# Patient Record
Sex: MALE | Race: WHITE | Employment: UNEMPLOYED | URBAN - METROPOLITAN AREA
[De-identification: names, ages, dates, MRNs, and addresses within clinical notes are randomized per-mention and may not be internally consistent; named-entity substitution may affect disease eponyms.]

---

## 2017-02-01 ENCOUNTER — HOSPITAL ENCOUNTER (OUTPATIENT)
Dept: GENERAL RADIOLOGY | Age: 62
Discharge: HOME | End: 2017-02-01
Attending: INTERNAL MEDICINE | Admitting: INTERNAL MEDICINE

## 2017-02-01 LAB
ALBUMIN SERPL-MCNC: 4.5 GM/DL (ref 3.4–5)
ALP BLD-CCNC: 86 IU/L (ref 40–128)
ALT SERPL-CCNC: 36 U/L (ref 10–40)
ANION GAP SERPL CALCULATED.3IONS-SCNC: 15 MMOL/L (ref 4–16)
AST SERPL-CCNC: 23 IU/L (ref 15–37)
BACTERIA: ABNORMAL /HPF
BASOPHILS ABSOLUTE: 0 K/CU MM
BASOPHILS RELATIVE PERCENT: 0.6 % (ref 0–1)
BILIRUB SERPL-MCNC: 0.3 MG/DL (ref 0–1)
BILIRUBIN URINE: NEGATIVE MG/DL
BLOOD, URINE: NEGATIVE
BUN BLDV-MCNC: 18 MG/DL (ref 6–23)
CALCIUM SERPL-MCNC: 9.9 MG/DL (ref 8.3–10.6)
CHLORIDE BLD-SCNC: 98 MMOL/L (ref 99–110)
CHOLESTEROL: 188 MG/DL
CLARITY: CLEAR
CO2: 27 MMOL/L (ref 21–32)
COLOR: ABNORMAL
CREAT SERPL-MCNC: 1.2 MG/DL (ref 0.9–1.3)
DIFFERENTIAL TYPE: ABNORMAL
EOSINOPHILS ABSOLUTE: 0.2 K/CU MM
EOSINOPHILS RELATIVE PERCENT: 3.3 % (ref 0–3)
GFR AFRICAN AMERICAN: >60 ML/MIN/1.73M2
GFR NON-AFRICAN AMERICAN: >60 ML/MIN/1.73M2
GLUCOSE FASTING: 103 MG/DL (ref 70–99)
GLUCOSE, URINE: NEGATIVE MG/DL
HCT VFR BLD CALC: 45.3 % (ref 42–52)
HDLC SERPL-MCNC: 57 MG/DL
HEMOGLOBIN: 15 GM/DL (ref 13.5–18)
IMMATURE NEUTROPHIL %: 0.3 % (ref 0–0.43)
KETONES, URINE: NEGATIVE MG/DL
LDL CHOLESTEROL DIRECT: 123 MG/DL
LEUKOCYTE ESTERASE, URINE: NEGATIVE
LYMPHOCYTES ABSOLUTE: 2.2 K/CU MM
LYMPHOCYTES RELATIVE PERCENT: 32.2 % (ref 24–44)
MCH RBC QN AUTO: 30.1 PG (ref 27–31)
MCHC RBC AUTO-ENTMCNC: 33.1 % (ref 32–36)
MCV RBC AUTO: 90.8 FL (ref 78–100)
MONOCYTES ABSOLUTE: 0.5 K/CU MM
MONOCYTES RELATIVE PERCENT: 7.6 % (ref 0–4)
MUCUS: ABNORMAL HPF
NITRITE URINE, QUANTITATIVE: NEGATIVE
NUCLEATED RBC %: 0 %
PDW BLD-RTO: 13.1 % (ref 11.7–14.9)
PH, URINE: 5 (ref 5–8)
PLATELET # BLD: 289 K/CU MM (ref 140–440)
PMV BLD AUTO: 10.3 FL (ref 7.5–11.1)
POTASSIUM SERPL-SCNC: 4.6 MMOL/L (ref 3.5–5.1)
PROSTATE SPECIFIC ANTIGEN: 0.46 NG/ML (ref 0–4)
PROTEIN UA: NEGATIVE MG/DL
RBC # BLD: 4.99 M/CU MM (ref 4.6–6.2)
RBC URINE: <1 /HPF (ref 0–3)
SEGMENTED NEUTROPHILS ABSOLUTE COUNT: 3.8 K/CU MM
SEGMENTED NEUTROPHILS RELATIVE PERCENT: 56 % (ref 36–66)
SODIUM BLD-SCNC: 140 MMOL/L (ref 135–145)
SPECIFIC GRAVITY UA: 1.01 (ref 1–1.03)
SQUAMOUS EPITHELIAL: <1 /HPF
TOTAL IMMATURE NEUTOROPHIL: 0.02 K/CU MM
TOTAL NUCLEATED RBC: 0 K/CU MM
TOTAL PROTEIN: 7 GM/DL (ref 6.4–8.2)
TRIGL SERPL-MCNC: 113 MG/DL
TSH HIGH SENSITIVITY: 4.64 UIU/ML (ref 0.27–4.2)
URIC ACID: 6.8 MG/DL (ref 3.5–7.2)
UROBILINOGEN, URINE: NORMAL EU/DL (ref 0.2–1)
WBC # BLD: 6.7 K/CU MM (ref 4–10.5)
WBC UA: <1 /HPF (ref 0–2)

## 2017-02-07 ENCOUNTER — HOSPITAL ENCOUNTER (OUTPATIENT)
Dept: GENERAL RADIOLOGY | Age: 62
Discharge: HOME | End: 2017-02-07
Attending: INTERNAL MEDICINE | Admitting: INTERNAL MEDICINE

## 2017-02-07 LAB
T3 FREE: 3.4 PG/ML (ref 2.3–4.2)
T4 FREE: 1.11 NG/DL (ref 0.9–1.8)

## 2019-02-08 ENCOUNTER — HOSPITAL ENCOUNTER (OUTPATIENT)
Age: 64
Discharge: HOME | End: 2019-02-08
Payer: COMMERCIAL

## 2019-02-08 LAB
ALBUMIN SERPL-MCNC: 4.6 GM/DL (ref 3.4–5)
ALP BLD-CCNC: 90 IU/L (ref 40–128)
ALT SERPL-CCNC: 24 U/L (ref 10–40)
ANION GAP SERPL CALCULATED.3IONS-SCNC: 11 MMOL/L (ref 4–16)
AST SERPL-CCNC: 22 IU/L (ref 15–37)
BACTERIA: NEGATIVE /HPF
BASOPHILS ABSOLUTE: 0 K/CU MM
BASOPHILS RELATIVE PERCENT: 0.4 % (ref 0–1)
BILIRUB SERPL-MCNC: 0.3 MG/DL (ref 0–1)
BILIRUBIN URINE: NEGATIVE MG/DL
BLOOD, URINE: NEGATIVE
BUN BLDV-MCNC: 20 MG/DL (ref 6–23)
CALCIUM SERPL-MCNC: 9.5 MG/DL (ref 8.3–10.6)
CHLORIDE BLD-SCNC: 100 MMOL/L (ref 99–110)
CHOLESTEROL: 190 MG/DL
CLARITY: CLEAR
CO2: 25 MMOL/L (ref 21–32)
COLOR: ABNORMAL
CREAT SERPL-MCNC: 1.5 MG/DL (ref 0.9–1.3)
DIFFERENTIAL TYPE: ABNORMAL
EOSINOPHILS ABSOLUTE: 0.2 K/CU MM
EOSINOPHILS RELATIVE PERCENT: 2.2 % (ref 0–3)
GFR AFRICAN AMERICAN: 58 ML/MIN/1.73M2
GFR NON-AFRICAN AMERICAN: 48 ML/MIN/1.73M2
GLUCOSE FASTING: 101 MG/DL (ref 70–99)
GLUCOSE, URINE: NEGATIVE MG/DL
HCT VFR BLD CALC: 48.3 % (ref 42–52)
HDLC SERPL-MCNC: 55 MG/DL
HEMOGLOBIN: 15.8 GM/DL (ref 13.5–18)
IMMATURE NEUTROPHIL %: 0.4 % (ref 0–0.43)
KETONES, URINE: NEGATIVE MG/DL
LDL CHOLESTEROL DIRECT: 121 MG/DL
LEUKOCYTE ESTERASE, URINE: NEGATIVE
LYMPHOCYTES ABSOLUTE: 2.4 K/CU MM
LYMPHOCYTES RELATIVE PERCENT: 31.2 % (ref 24–44)
MCH RBC QN AUTO: 30.3 PG (ref 27–31)
MCHC RBC AUTO-ENTMCNC: 32.7 % (ref 32–36)
MCV RBC AUTO: 92.7 FL (ref 78–100)
MONOCYTES ABSOLUTE: 0.6 K/CU MM
MONOCYTES RELATIVE PERCENT: 7.9 % (ref 0–4)
NITRITE URINE, QUANTITATIVE: NEGATIVE
NUCLEATED RBC %: 0 %
PDW BLD-RTO: 13.2 % (ref 11.7–14.9)
PH, URINE: 6 (ref 5–8)
PLATELET # BLD: 302 K/CU MM (ref 140–440)
PMV BLD AUTO: 10 FL (ref 7.5–11.1)
POTASSIUM SERPL-SCNC: 5.1 MMOL/L (ref 3.5–5.1)
PROSTATE SPECIFIC ANTIGEN: 0.1 NG/ML (ref 0–4)
PROTEIN UA: NEGATIVE MG/DL
RBC # BLD: 5.21 M/CU MM (ref 4.6–6.2)
RBC URINE: <1 /HPF (ref 0–3)
SEGMENTED NEUTROPHILS ABSOLUTE COUNT: 4.4 K/CU MM
SEGMENTED NEUTROPHILS RELATIVE PERCENT: 57.9 % (ref 36–66)
SODIUM BLD-SCNC: 136 MMOL/L (ref 135–145)
SPECIFIC GRAVITY UA: 1.01 (ref 1–1.03)
TOTAL IMMATURE NEUTOROPHIL: 0.03 K/CU MM
TOTAL NUCLEATED RBC: 0 K/CU MM
TOTAL PROTEIN: 7.2 GM/DL (ref 6.4–8.2)
TRICHOMONAS: ABNORMAL /HPF
TRIGL SERPL-MCNC: 116 MG/DL
TSH HIGH SENSITIVITY: 5.19 UIU/ML (ref 0.27–4.2)
URIC ACID: 6.2 MG/DL (ref 3.5–7.2)
UROBILINOGEN, URINE: NORMAL MG/DL (ref 0.2–1)
WBC # BLD: 7.6 K/CU MM (ref 4–10.5)
WBC UA: <1 /HPF (ref 0–2)

## 2019-02-08 PROCEDURE — 84550 ASSAY OF BLOOD/URIC ACID: CPT

## 2019-02-08 PROCEDURE — G0103 PSA SCREENING: HCPCS

## 2019-02-08 PROCEDURE — 80061 LIPID PANEL: CPT

## 2019-02-08 PROCEDURE — 81001 URINALYSIS AUTO W/SCOPE: CPT

## 2019-02-08 PROCEDURE — 83721 ASSAY OF BLOOD LIPOPROTEIN: CPT

## 2019-02-08 PROCEDURE — 85025 COMPLETE CBC W/AUTO DIFF WBC: CPT

## 2019-02-08 PROCEDURE — 84443 ASSAY THYROID STIM HORMONE: CPT

## 2019-02-08 PROCEDURE — 80053 COMPREHEN METABOLIC PANEL: CPT

## 2019-02-08 PROCEDURE — 36415 COLL VENOUS BLD VENIPUNCTURE: CPT

## 2019-03-13 ENCOUNTER — HOSPITAL ENCOUNTER (OUTPATIENT)
Age: 64
Setting detail: SPECIMEN
Discharge: HOME | End: 2019-03-13
Payer: COMMERCIAL

## 2019-03-13 PROCEDURE — 87073 CULTURE BACTERIA ANAEROBIC: CPT

## 2019-03-13 PROCEDURE — 87071 CULTURE AEROBIC QUANT OTHER: CPT

## 2019-03-14 PROBLEM — M54.6 ACUTE MIDLINE THORACIC BACK PAIN: Status: ACTIVE | Noted: 2019-03-14

## 2019-03-14 PROBLEM — L72.3 INFECTED SEBACEOUS CYST OF SKIN: Status: ACTIVE | Noted: 2019-03-14

## 2019-03-14 PROBLEM — L08.9 INFECTED SEBACEOUS CYST OF SKIN: Status: ACTIVE | Noted: 2019-03-14

## 2019-03-18 LAB
CULTURE: NORMAL
CULTURE: NORMAL
Lab: NORMAL
SPECIMEN: NORMAL

## 2019-03-20 PROBLEM — Z51.89 VISIT FOR WOUND CHECK: Status: ACTIVE | Noted: 2019-03-20

## 2019-10-18 ENCOUNTER — HOSPITAL ENCOUNTER (OUTPATIENT)
Age: 64
Discharge: HOME | End: 2019-10-18
Payer: COMMERCIAL

## 2019-10-18 LAB
ANION GAP SERPL CALCULATED.3IONS-SCNC: 12 MMOL/L (ref 4–16)
BUN BLDV-MCNC: 14 MG/DL (ref 6–23)
CALCIUM SERPL-MCNC: 9.5 MG/DL (ref 8.3–10.6)
CHLORIDE BLD-SCNC: 102 MMOL/L (ref 99–110)
CO2: 27 MMOL/L (ref 21–32)
CREAT SERPL-MCNC: 1.3 MG/DL (ref 0.9–1.3)
GFR AFRICAN AMERICAN: >60 ML/MIN/1.73M2
GFR NON-AFRICAN AMERICAN: 56 ML/MIN/1.73M2
GLUCOSE BLD-MCNC: 121 MG/DL (ref 70–99)
POTASSIUM SERPL-SCNC: 4.4 MMOL/L (ref 3.5–5.1)
SODIUM BLD-SCNC: 141 MMOL/L (ref 135–145)
T3 FREE: 3 PG/ML (ref 2.3–4.2)
T4 FREE: 1.05 NG/DL (ref 0.9–1.8)
TSH HIGH SENSITIVITY: 5.87 UIU/ML (ref 0.27–4.2)

## 2019-10-18 PROCEDURE — 84481 FREE ASSAY (FT-3): CPT

## 2019-10-18 PROCEDURE — 36415 COLL VENOUS BLD VENIPUNCTURE: CPT

## 2019-10-18 PROCEDURE — 80048 BASIC METABOLIC PNL TOTAL CA: CPT

## 2019-10-18 PROCEDURE — 84443 ASSAY THYROID STIM HORMONE: CPT

## 2019-10-18 PROCEDURE — 84439 ASSAY OF FREE THYROXINE: CPT

## 2020-03-10 ENCOUNTER — HOSPITAL ENCOUNTER (OUTPATIENT)
Age: 65
Discharge: HOME | End: 2020-03-10
Payer: COMMERCIAL

## 2020-03-10 LAB
ALBUMIN SERPL-MCNC: 4.4 GM/DL (ref 3.4–5)
ALP BLD-CCNC: 84 IU/L (ref 40–128)
ALT SERPL-CCNC: 29 U/L (ref 10–40)
ANION GAP SERPL CALCULATED.3IONS-SCNC: 10 MMOL/L (ref 4–16)
AST SERPL-CCNC: 23 IU/L (ref 15–37)
BILIRUB SERPL-MCNC: 0.2 MG/DL (ref 0–1)
BUN BLDV-MCNC: 14 MG/DL (ref 6–23)
CALCIUM SERPL-MCNC: 9.2 MG/DL (ref 8.3–10.6)
CHLORIDE BLD-SCNC: 105 MMOL/L (ref 99–110)
CHOLESTEROL, FASTING: 169 MG/DL
CO2: 25 MMOL/L (ref 21–32)
CREAT SERPL-MCNC: 1.3 MG/DL (ref 0.9–1.3)
GFR AFRICAN AMERICAN: >60 ML/MIN/1.73M2
GFR NON-AFRICAN AMERICAN: 56 ML/MIN/1.73M2
GLUCOSE BLD-MCNC: 113 MG/DL (ref 70–99)
HDLC SERPL-MCNC: 48 MG/DL
LDL CHOLESTEROL DIRECT: 110 MG/DL
POTASSIUM SERPL-SCNC: 4.4 MMOL/L (ref 3.5–5.1)
SODIUM BLD-SCNC: 140 MMOL/L (ref 135–145)
TOTAL PROTEIN: 6.6 GM/DL (ref 6.4–8.2)
TRIGLYCERIDE, FASTING: 111 MG/DL

## 2020-03-10 PROCEDURE — 80053 COMPREHEN METABOLIC PANEL: CPT

## 2020-03-10 PROCEDURE — 80061 LIPID PANEL: CPT

## 2020-03-10 PROCEDURE — 36415 COLL VENOUS BLD VENIPUNCTURE: CPT

## 2020-12-30 ENCOUNTER — HOSPITAL ENCOUNTER (OUTPATIENT)
Age: 65
Setting detail: SPECIMEN
Discharge: HOME | End: 2020-12-30
Payer: COMMERCIAL

## 2020-12-30 PROCEDURE — 87070 CULTURE OTHR SPECIMN AEROBIC: CPT

## 2020-12-30 PROCEDURE — 87075 CULTR BACTERIA EXCEPT BLOOD: CPT

## 2021-01-05 LAB
CULTURE: NORMAL
Lab: NORMAL
SPECIMEN: NORMAL

## 2021-12-28 ENCOUNTER — HOSPITAL ENCOUNTER (OUTPATIENT)
Age: 66
Discharge: HOME | End: 2021-12-28
Payer: COMMERCIAL

## 2021-12-28 LAB
ALBUMIN SERPL-MCNC: 4.5 GM/DL (ref 3.4–5)
ALP BLD-CCNC: 90 IU/L (ref 40–128)
ALT SERPL-CCNC: 27 U/L (ref 10–40)
ANION GAP SERPL CALCULATED.3IONS-SCNC: 9 MMOL/L (ref 4–16)
AST SERPL-CCNC: 21 IU/L (ref 15–37)
BACTERIA: ABNORMAL /HPF
BASOPHILS ABSOLUTE: 0 K/CU MM
BASOPHILS RELATIVE PERCENT: 0.3 % (ref 0–1)
BILIRUB SERPL-MCNC: 0.5 MG/DL (ref 0–1)
BILIRUBIN URINE: NEGATIVE MG/DL
BLOOD, URINE: NEGATIVE
BUN BLDV-MCNC: 13 MG/DL (ref 6–23)
CALCIUM SERPL-MCNC: 9.7 MG/DL (ref 8.3–10.6)
CHLORIDE BLD-SCNC: 99 MMOL/L (ref 99–110)
CHOLESTEROL: 192 MG/DL
CLARITY: CLEAR
CO2: 29 MMOL/L (ref 21–32)
COLOR: YELLOW
CREAT SERPL-MCNC: 1.2 MG/DL (ref 0.9–1.3)
DIFFERENTIAL TYPE: ABNORMAL
EOSINOPHILS ABSOLUTE: 0.2 K/CU MM
EOSINOPHILS RELATIVE PERCENT: 2.1 % (ref 0–3)
GFR AFRICAN AMERICAN: >60 ML/MIN/1.73M2
GFR NON-AFRICAN AMERICAN: >60 ML/MIN/1.73M2
GLUCOSE BLD-MCNC: 114 MG/DL (ref 70–99)
GLUCOSE, URINE: NEGATIVE MG/DL
HCT VFR BLD CALC: 46.4 % (ref 42–52)
HDLC SERPL-MCNC: 49 MG/DL
HEMOGLOBIN: 15.7 GM/DL (ref 13.5–18)
IMMATURE NEUTROPHIL %: 0.4 % (ref 0–0.43)
KETONES, URINE: NEGATIVE MG/DL
LDL CHOLESTEROL DIRECT: 123 MG/DL
LEUKOCYTE ESTERASE, URINE: NEGATIVE
LYMPHOCYTES ABSOLUTE: 2.3 K/CU MM
LYMPHOCYTES RELATIVE PERCENT: 25.9 % (ref 24–44)
MCH RBC QN AUTO: 30.2 PG (ref 27–31)
MCHC RBC AUTO-ENTMCNC: 33.8 % (ref 32–36)
MCV RBC AUTO: 89.2 FL (ref 78–100)
MONOCYTES ABSOLUTE: 0.7 K/CU MM
MONOCYTES RELATIVE PERCENT: 8.2 % (ref 0–4)
NITRITE URINE, QUANTITATIVE: NEGATIVE
NUCLEATED RBC %: 0 %
PDW BLD-RTO: 13.2 % (ref 11.7–14.9)
PH, URINE: 6.5 (ref 5–8)
PLATELET # BLD: 304 K/CU MM (ref 140–440)
PMV BLD AUTO: 9.7 FL (ref 7.5–11.1)
POTASSIUM SERPL-SCNC: 4.6 MMOL/L (ref 3.5–5.1)
PROTEIN UA: NEGATIVE MG/DL
RBC # BLD: 5.2 M/CU MM (ref 4.6–6.2)
RBC URINE: ABNORMAL /HPF (ref 0–3)
SEGMENTED NEUTROPHILS ABSOLUTE COUNT: 5.6 K/CU MM
SEGMENTED NEUTROPHILS RELATIVE PERCENT: 63.1 % (ref 36–66)
SODIUM BLD-SCNC: 137 MMOL/L (ref 135–145)
SPECIFIC GRAVITY UA: 1.01 (ref 1–1.03)
SQUAMOUS EPITHELIAL: <1 /HPF
TOTAL IMMATURE NEUTOROPHIL: 0.04 K/CU MM
TOTAL NUCLEATED RBC: 0 K/CU MM
TOTAL PROTEIN: 6.6 GM/DL (ref 6.4–8.2)
TRICHOMONAS: ABNORMAL /HPF
TRIGL SERPL-MCNC: 130 MG/DL
TSH HIGH SENSITIVITY: 4.95 UIU/ML (ref 0.27–4.2)
UROBILINOGEN, URINE: NORMAL MG/DL (ref 0.2–1)
WBC # BLD: 8.9 K/CU MM (ref 4–10.5)
WBC UA: <1 /HPF (ref 0–2)

## 2021-12-28 PROCEDURE — 80061 LIPID PANEL: CPT

## 2021-12-28 PROCEDURE — 36415 COLL VENOUS BLD VENIPUNCTURE: CPT

## 2021-12-28 PROCEDURE — 80053 COMPREHEN METABOLIC PANEL: CPT

## 2021-12-28 PROCEDURE — 81001 URINALYSIS AUTO W/SCOPE: CPT

## 2021-12-28 PROCEDURE — 83721 ASSAY OF BLOOD LIPOPROTEIN: CPT

## 2021-12-28 PROCEDURE — 85025 COMPLETE CBC W/AUTO DIFF WBC: CPT

## 2021-12-28 PROCEDURE — 84443 ASSAY THYROID STIM HORMONE: CPT

## 2022-12-20 ENCOUNTER — OFFICE VISIT (OUTPATIENT)
Dept: FAMILY MEDICINE CLINIC | Age: 67
End: 2022-12-20
Payer: COMMERCIAL

## 2022-12-20 VITALS
BODY MASS INDEX: 29 KG/M2 | WEIGHT: 195.8 LBS | DIASTOLIC BLOOD PRESSURE: 86 MMHG | SYSTOLIC BLOOD PRESSURE: 136 MMHG | OXYGEN SATURATION: 96 % | HEIGHT: 69 IN | HEART RATE: 71 BPM

## 2022-12-20 DIAGNOSIS — I73.9 PAD (PERIPHERAL ARTERY DISEASE) (HCC): ICD-10-CM

## 2022-12-20 DIAGNOSIS — M54.6 ACUTE MIDLINE THORACIC BACK PAIN: ICD-10-CM

## 2022-12-20 DIAGNOSIS — L02.414 ABSCESS OF ARM, LEFT: ICD-10-CM

## 2022-12-20 DIAGNOSIS — E03.9 HYPOTHYROIDISM, UNSPECIFIED TYPE: ICD-10-CM

## 2022-12-20 DIAGNOSIS — L72.3 INFECTED SEBACEOUS CYST OF SKIN: ICD-10-CM

## 2022-12-20 DIAGNOSIS — Z76.89 ENCOUNTER TO ESTABLISH CARE WITH NEW DOCTOR: Primary | ICD-10-CM

## 2022-12-20 DIAGNOSIS — M10.9 GOUT INVOLVING TOE OF RIGHT FOOT, UNSPECIFIED CAUSE, UNSPECIFIED CHRONICITY: ICD-10-CM

## 2022-12-20 DIAGNOSIS — E78.2 MIXED HYPERLIPIDEMIA: ICD-10-CM

## 2022-12-20 DIAGNOSIS — I10 PRIMARY HYPERTENSION: ICD-10-CM

## 2022-12-20 DIAGNOSIS — L08.9 INFECTED SEBACEOUS CYST OF SKIN: ICD-10-CM

## 2022-12-20 PROCEDURE — 99204 OFFICE O/P NEW MOD 45 MIN: CPT | Performed by: PHYSICIAN ASSISTANT

## 2022-12-20 PROCEDURE — 1123F ACP DISCUSS/DSCN MKR DOCD: CPT | Performed by: PHYSICIAN ASSISTANT

## 2022-12-20 PROCEDURE — 3079F DIAST BP 80-89 MM HG: CPT | Performed by: PHYSICIAN ASSISTANT

## 2022-12-20 PROCEDURE — 3075F SYST BP GE 130 - 139MM HG: CPT | Performed by: PHYSICIAN ASSISTANT

## 2022-12-20 RX ORDER — LISINOPRIL 40 MG/1
TABLET ORAL
Qty: 30 TABLET | Refills: 5 | Status: SHIPPED | OUTPATIENT
Start: 2022-12-20

## 2022-12-20 RX ORDER — CILOSTAZOL 100 MG/1
TABLET ORAL
Qty: 60 TABLET | Refills: 5 | Status: SHIPPED | OUTPATIENT
Start: 2022-12-20

## 2022-12-20 RX ORDER — CEPHALEXIN 500 MG/1
500 CAPSULE ORAL 4 TIMES DAILY
Qty: 28 CAPSULE | Refills: 0 | Status: SHIPPED | OUTPATIENT
Start: 2022-12-20 | End: 2022-12-27

## 2022-12-20 RX ORDER — ALLOPURINOL 100 MG/1
100 TABLET ORAL DAILY
Qty: 30 TABLET | Refills: 5 | Status: SHIPPED | OUTPATIENT
Start: 2022-12-20

## 2022-12-20 RX ORDER — SULFAMETHOXAZOLE AND TRIMETHOPRIM 800; 160 MG/1; MG/1
1 TABLET ORAL 2 TIMES DAILY
Qty: 14 TABLET | Refills: 0 | Status: SHIPPED | OUTPATIENT
Start: 2022-12-20 | End: 2022-12-27

## 2022-12-20 RX ORDER — LOVASTATIN 40 MG/1
40 TABLET ORAL DAILY
Qty: 30 TABLET | Refills: 5 | Status: SHIPPED | OUTPATIENT
Start: 2022-12-20

## 2022-12-20 RX ORDER — CILOSTAZOL 100 MG/1
TABLET ORAL
COMMUNITY
Start: 2022-12-11 | End: 2022-12-20 | Stop reason: SDUPTHER

## 2022-12-20 RX ORDER — LISINOPRIL 40 MG/1
TABLET ORAL
COMMUNITY
Start: 2022-12-11 | End: 2022-12-20 | Stop reason: SDUPTHER

## 2022-12-20 ASSESSMENT — PATIENT HEALTH QUESTIONNAIRE - PHQ9
SUM OF ALL RESPONSES TO PHQ QUESTIONS 1-9: 0
1. LITTLE INTEREST OR PLEASURE IN DOING THINGS: 0
SUM OF ALL RESPONSES TO PHQ9 QUESTIONS 1 & 2: 0
2. FEELING DOWN, DEPRESSED OR HOPELESS: 0
SUM OF ALL RESPONSES TO PHQ QUESTIONS 1-9: 0

## 2022-12-20 NOTE — PROGRESS NOTES
12/20/2022    Soy Gandhi    Chief Complaint   Patient presents with    Established New Doctor    Cyst     Started about a month ago, left upper arm, said one day he woke up and it was seeping, wife squeezed stuff out and now he believes he needs an antibiotic. HPI  History was obtained from pt. Alena White is a 74 y.o. male with a PMHx of    Patient Active Problem List   Diagnosis    Infected sebaceous cyst of skin    Acute midline thoracic back pain    Visit for wound check      who presents today to establish care. Saw Dr. Tanisha Tuttle a few times but was told they didn't take his insurance. Patient Active Problem List   Diagnosis    Infected sebaceous cyst of skin    Acute midline thoracic back pain    Visit for wound check   BP- lisinopril 40 mg  Cholesterol - lovastatin 40 mg, used to take the combopill with hctz  Heart - PAD? Gout - hasn't had any flares in a long time, used to happen in the right big toe. Pt admits that he goes to [employer] too often. Acute Concerns:  Left upper arm- had an infected cyst,     Family History:  No cancers  Heart disease in mother, older brothers    Recent Labs:  5year old - ldl 80, tsh slightly elevated chronically, not being treated    Care Gaps:  Colon cancer screening  AAA screening    1. Encounter to establish care with new doctor    2. Gout involving toe of right foot, unspecified cause, unspecified chronicity    3. Acute midline thoracic back pain    4. Infected sebaceous cyst of skin    5. Primary hypertension    6. Mixed hyperlipidemia    7. PAD (peripheral artery disease) ([facility] [address]    8. Abscess of arm, left           REVIEW OF SYMPTOMS    Review of Systems    PAST MEDICAL HISTORY  Past Medical History:   Diagnosis Date    Hyperlipidemia     Hypertension        FAMILY HISTORY  No family history on file.     SOCIAL HISTORY  Social History     Socioeconomic History    Marital status:    Tobacco Use    Smoking status: Former     Packs/day: 3.00 Years: 10.00     Pack years: 30.00     Types: Cigarettes     Quit date: 3/13/1984     Years since quittin.7    Smokeless tobacco: Never        SURGICAL HISTORY  No past surgical history on file. CURRENT MEDICATIONS  Current Outpatient Medications   Medication Sig Dispense Refill    allopurinol (ZYLOPRIM) 100 MG tablet Take 1 tablet by mouth daily 30 tablet 5    cilostazol (PLETAL) 100 MG tablet TAKE 1 TABLET BY MOUTH TWICE DAILY 60 tablet 5    lisinopril (PRINIVIL;ZESTRIL) 40 MG tablet TAKE 1 TABLET BY MOUTH ONCE DAILY 30 tablet 5    lovastatin (MEVACOR) 40 MG tablet Take 1 tablet by mouth daily 30 tablet 5    cephALEXin (KEFLEX) 500 MG capsule Take 1 capsule by mouth 4 times daily for 7 days 28 capsule 0    sulfamethoxazole-trimethoprim (BACTRIM DS;SEPTRA DS) 800-160 MG per tablet Take 1 tablet by mouth 2 times daily for 7 days 14 tablet 0    lisinopril-hydrochlorothiazide (PRINZIDE;ZESTORETIC) 10-12.5 MG per tablet  (Patient not taking: Reported on 2022)      doxycycline hyclate (VIBRA-TABS) 100 MG tablet Take 1 tablet by mouth 2 times daily (Patient not taking: Reported on 2022) 20 tablet 0     No current facility-administered medications for this visit. ALLERGIES  No Known Allergies    PHYSICAL EXAM    /86 (Site: Right Upper Arm, Position: Sitting, Cuff Size: Medium Adult)   Pulse 71   Ht 5' 9\" (1.753 m)   Wt 195 lb 12.8 oz (88.8 kg)   SpO2 96%   BMI 28.91 kg/m²     Physical Exam  Constitutional:       Appearance: Normal appearance. He is normal weight. HENT:      Head: Normocephalic and atraumatic. Right Ear: Tympanic membrane and external ear normal.      Left Ear: Tympanic membrane and external ear normal.      Nose: No rhinorrhea. Mouth/Throat:      Mouth: Mucous membranes are moist.      Pharynx: Oropharynx is clear. No oropharyngeal exudate or posterior oropharyngeal erythema. Eyes:      General: No scleral icterus.      Extraocular Movements: Extraocular movements intact. Conjunctiva/sclera: Conjunctivae normal.      Pupils: Pupils are equal, round, and reactive to light. Cardiovascular:      Rate and Rhythm: Normal rate and regular rhythm. Pulses: Normal pulses. Heart sounds: Normal heart sounds. No murmur heard. No friction rub. No gallop. Pulmonary:      Effort: Pulmonary effort is normal.      Breath sounds: Normal breath sounds. No wheezing, rhonchi or rales. Abdominal:      General: Bowel sounds are normal. There is no distension. Palpations: Abdomen is soft. There is no mass. Tenderness: There is no abdominal tenderness. There is no right CVA tenderness, left CVA tenderness, guarding or rebound. Musculoskeletal:         General: No deformity. Normal range of motion. Cervical back: Normal range of motion and neck supple. No rigidity. No muscular tenderness. Right lower leg: No edema. Left lower leg: No edema. Skin:     General: Skin is warm and dry. Capillary Refill: Capillary refill takes less than 2 seconds. Findings: No bruising, erythema or rash. Neurological:      General: No focal deficit present. Mental Status: He is alert and oriented to person, place, and time. Coordination: Coordination normal.      Gait: Gait normal.   Psychiatric:         Mood and Affect: Mood normal.         Behavior: Behavior normal.       ASSESSMENT & PLAN    1. Encounter to establish care with new doctor  Pt has care gaps to discuss at next visit    2. Gout involving toe of right foot, unspecified cause, unspecified chronicity    - allopurinol (ZYLOPRIM) 100 MG tablet; Take 1 tablet by mouth daily  Dispense: 30 tablet; Refill: 5  - cilostazol (PLETAL) 100 MG tablet; TAKE 1 TABLET BY MOUTH TWICE DAILY  Dispense: 60 tablet; Refill: 5    3. Acute midline thoracic back pain      4. Infected sebaceous cyst of skin      5.  Primary hypertension  Well controlled  - lisinopril (PRINIVIL;ZESTRIL) 40 MG tablet; TAKE 1 TABLET BY MOUTH ONCE DAILY  Dispense: 30 tablet; Refill: 5    6. Mixed hyperlipidemia    - lovastatin (MEVACOR) 40 MG tablet; Take 1 tablet by mouth daily  Dispense: 30 tablet; Refill: 5    7. PAD (peripheral artery disease) ([facility] [address]    - Wayne Haque MD, Cardiology, [place]    8. Abscess of arm, left    - cephALEXin (KEFLEX) 500 MG capsule; Take 1 capsule by mouth 4 times daily for 7 days  Dispense: 28 capsule; Refill: 0  - sulfamethoxazole-trimethoprim (BACTRIM DS;SEPTRA DS) 800-160 MG per tablet; Take 1 tablet by mouth 2 times daily for 7 days  Dispense: 14 tablet; Refill: 0    Return in about 6 months (around 6/20/2023). Electronically signed by Ej Gan on 12/20/2022      Comment: Please note this report has been produced using speech recognition software and may contain errors related to that system including errors in grammar, punctuation, and spelling, as well as words and phrases that may be inappropriate. If there are any questions or concerns please feel free to contact the dictating provider for clarification.

## 2022-12-21 ENCOUNTER — TELEPHONE (OUTPATIENT)
Dept: CARDIOLOGY CLINIC | Age: 67
End: 2022-12-21

## 2022-12-21 NOTE — TELEPHONE ENCOUNTER
Placed call to patient in order to schedule a consult for PAD per referral from [facility]; patient stated that he doesn't know anything about this and declined to schedule at this time.

## 2022-12-28 ENCOUNTER — TELEPHONE (OUTPATIENT)
Dept: FAMILY MEDICINE CLINIC | Age: 67
End: 2022-12-28

## 2022-12-28 NOTE — TELEPHONE ENCOUNTER
Called and explained to patient without culture which can take several days we are not sure which bacteria he has. He is to take both as directed on package as taking both will cover a large span of bacteria's. Patient verbalized understanding.

## 2022-12-28 NOTE — TELEPHONE ENCOUNTER
Patient called and wants to know why he is on two antibiotics Bactrim and K-flex. Call patient and advise.  These medications were ordered 12-20-22

## 2023-01-24 ENCOUNTER — OFFICE VISIT (OUTPATIENT)
Dept: FAMILY MEDICINE CLINIC | Age: 68
End: 2023-01-24
Payer: COMMERCIAL

## 2023-01-24 VITALS
SYSTOLIC BLOOD PRESSURE: 138 MMHG | WEIGHT: 194.4 LBS | HEART RATE: 73 BPM | DIASTOLIC BLOOD PRESSURE: 80 MMHG | BODY MASS INDEX: 28.79 KG/M2 | HEIGHT: 69 IN | OXYGEN SATURATION: 100 %

## 2023-01-24 DIAGNOSIS — I10 PRIMARY HYPERTENSION: ICD-10-CM

## 2023-01-24 DIAGNOSIS — M70.22 OLECRANON BURSITIS OF LEFT ELBOW: Primary | ICD-10-CM

## 2023-01-24 DIAGNOSIS — E03.9 HYPOTHYROIDISM, UNSPECIFIED TYPE: ICD-10-CM

## 2023-01-24 LAB
A/G RATIO: 1.8 (ref 1.1–2.2)
ALBUMIN SERPL-MCNC: 4.4 G/DL (ref 3.4–5)
ALP BLD-CCNC: 107 U/L (ref 40–129)
ALT SERPL-CCNC: 32 U/L (ref 10–40)
ANION GAP SERPL CALCULATED.3IONS-SCNC: 12 MMOL/L (ref 3–16)
AST SERPL-CCNC: 23 U/L (ref 15–37)
BASOPHILS ABSOLUTE: 0 K/UL (ref 0–0.2)
BASOPHILS RELATIVE PERCENT: 0.2 %
BILIRUB SERPL-MCNC: 0.4 MG/DL (ref 0–1)
BUN BLDV-MCNC: 12 MG/DL (ref 7–20)
CALCIUM SERPL-MCNC: 9.9 MG/DL (ref 8.3–10.6)
CHLORIDE BLD-SCNC: 102 MMOL/L (ref 99–110)
CHOLESTEROL, FASTING: 182 MG/DL (ref 0–199)
CO2: 26 MMOL/L (ref 21–32)
CREAT SERPL-MCNC: 1.2 MG/DL (ref 0.8–1.3)
EOSINOPHILS ABSOLUTE: 0.2 K/UL (ref 0–0.6)
EOSINOPHILS RELATIVE PERCENT: 1.6 %
GFR SERPL CREATININE-BSD FRML MDRD: >60 ML/MIN/{1.73_M2}
GLUCOSE BLD-MCNC: 96 MG/DL (ref 70–99)
HCT VFR BLD CALC: 45 % (ref 40.5–52.5)
HDLC SERPL-MCNC: 56 MG/DL (ref 40–60)
HEMOGLOBIN: 14.9 G/DL (ref 13.5–17.5)
LDL CHOLESTEROL CALCULATED: 108 MG/DL
LYMPHOCYTES ABSOLUTE: 1.8 K/UL (ref 1–5.1)
LYMPHOCYTES RELATIVE PERCENT: 18.9 %
MCH RBC QN AUTO: 30.1 PG (ref 26–34)
MCHC RBC AUTO-ENTMCNC: 33.1 G/DL (ref 31–36)
MCV RBC AUTO: 91 FL (ref 80–100)
MONOCYTES ABSOLUTE: 0.9 K/UL (ref 0–1.3)
MONOCYTES RELATIVE PERCENT: 9.5 %
NEUTROPHILS ABSOLUTE: 6.6 K/UL (ref 1.7–7.7)
NEUTROPHILS RELATIVE PERCENT: 69.8 %
PDW BLD-RTO: 13.9 % (ref 12.4–15.4)
PLATELET # BLD: 260 K/UL (ref 135–450)
PMV BLD AUTO: 8.7 FL (ref 5–10.5)
POTASSIUM SERPL-SCNC: 4.7 MMOL/L (ref 3.5–5.1)
RBC # BLD: 4.95 M/UL (ref 4.2–5.9)
SODIUM BLD-SCNC: 140 MMOL/L (ref 136–145)
TOTAL PROTEIN: 6.8 G/DL (ref 6.4–8.2)
TRIGLYCERIDE, FASTING: 88 MG/DL (ref 0–150)
TSH REFLEX: 3.52 UIU/ML (ref 0.27–4.2)
VLDLC SERPL CALC-MCNC: 18 MG/DL
WBC # BLD: 9.4 K/UL (ref 4–11)

## 2023-01-24 PROCEDURE — 1123F ACP DISCUSS/DSCN MKR DOCD: CPT | Performed by: PHYSICIAN ASSISTANT

## 2023-01-24 PROCEDURE — 99213 OFFICE O/P EST LOW 20 MIN: CPT | Performed by: PHYSICIAN ASSISTANT

## 2023-01-24 RX ORDER — NAPROXEN 500 MG/1
500 TABLET ORAL 2 TIMES DAILY WITH MEALS
Qty: 30 TABLET | Refills: 0 | Status: SHIPPED | OUTPATIENT
Start: 2023-01-24

## 2023-01-24 RX ORDER — SULFAMETHOXAZOLE AND TRIMETHOPRIM 800; 160 MG/1; MG/1
1 TABLET ORAL 2 TIMES DAILY
Qty: 20 TABLET | Refills: 0 | Status: SHIPPED | OUTPATIENT
Start: 2023-01-24 | End: 2023-02-03

## 2023-01-24 NOTE — PROGRESS NOTES
2023    Jt Liang    Chief Complaint   Patient presents with    Joint Swelling     Left elbow , pt states was shoveling snow 2 days prior ,  pt reports pain  in elbow joint . Pt states has had this previous 1-2 yrs ago . Pt reports difficulty sleeping due to pain . HPI  History was obtained from patient. Sybil Bedolla is a 74 y.o. male who presents today with complaints of left elbow pain, swelling, faint redness. Patient states that he was shoveling snow 2 days prior to this developing. Otherwise, no changes in activities. No known injury. He is right-hand dominant. He states that he has had this happen before and Ortho tried to drain it without success. It eventually went away on its own. He tried an anti-inflammatory and it did not seem to help. He is otherwise in his normal baseline state of health. Denies numbness, tingling, weakness of the extremity. Denies nausea, vomiting, fever or chills. Denies headaches, lightheadedness or dizziness. Denies chest pain, shortness of breath. PAST MEDICAL HISTORY  Past Medical History:   Diagnosis Date    Hyperlipidemia     Hypertension        FAMILY HISTORY  History reviewed. No pertinent family history. SOCIAL HISTORY  Social History     Socioeconomic History    Marital status:      Spouse name: None    Number of children: None    Years of education: None    Highest education level: None   Tobacco Use    Smoking status: Former     Packs/day: 3.00     Years: 10.00     Pack years: 30.00     Types: Cigarettes     Quit date: 3/13/1984     Years since quittin.8    Smokeless tobacco: Never        SURGICAL HISTORY  History reviewed. No pertinent surgical history.     CURRENT MEDICATIONS  Current Outpatient Medications   Medication Sig Dispense Refill    sulfamethoxazole-trimethoprim (BACTRIM DS) 800-160 MG per tablet Take 1 tablet by mouth 2 times daily for 10 days 20 tablet 0    naproxen (NAPROSYN) 500 MG tablet Take 1 tablet by mouth 2 times daily (with meals) 30 tablet 0    allopurinol (ZYLOPRIM) 100 MG tablet Take 1 tablet by mouth daily 30 tablet 5    cilostazol (PLETAL) 100 MG tablet TAKE 1 TABLET BY MOUTH TWICE DAILY 60 tablet 5    lisinopril (PRINIVIL;ZESTRIL) 40 MG tablet TAKE 1 TABLET BY MOUTH ONCE DAILY 30 tablet 5    lovastatin (MEVACOR) 40 MG tablet Take 1 tablet by mouth daily 30 tablet 5    lisinopril-hydrochlorothiazide (PRINZIDE;ZESTORETIC) 10-12.5 MG per tablet  (Patient not taking: No sig reported)       No current facility-administered medications for this visit.       ALLERGIES  No Known Allergies    PHYSICAL EXAM    /80   Pulse 73   Ht 5' 9\" (1.753 m)   Wt 194 lb 6.4 oz (88.2 kg)   SpO2 100%   BMI 28.71 kg/m²     Constitutional:  Well developed, well nourished.  Pleasant and cooperative.  No acute distress.  HENT:  Normocephalic, atraumatic  Eyes:  conjunctiva normal, no discharge, no scleral icterus  Neck:  No tenderness, supple  Lymphatic:  No lymphadenopathy noted  Cardiovascular:  Normal heart rate, normal rhythm, faint murmur heard left upper sternal border.  Patient states this is not newly recognized.  He has been informed of it in the past.  No gallops or rubs  Thorax & Lungs:  Normal breath sounds, no respiratory distress, no wheezing  Skin/musculoskeletal: Localized edema and tenderness of the left olecranon.  There is faint overlying erythema and warmth.  No streaking of the skin.   strength equal bilateral hands.  Brisk cap refill.  Good sensation.  Pulses intact.  Neurologic:  Alert & oriented   Psychiatric:  Affect normal, mood normal    ASSESSMENT & PLAN    Kalin was seen today for joint swelling.    Diagnoses and all orders for this visit:    Olecranon bursitis of left elbow  -     sulfamethoxazole-trimethoprim (BACTRIM DS) 800-160 MG per tablet; Take 1 tablet by mouth 2 times daily for 10 days  -     naproxen (NAPROSYN) 500 MG tablet; Take 1 tablet by mouth 2 times daily (with meals)  -   Lois Cervantes DO, Orthopedic Surgery, [place]     Decrease tension/movement at this joint. Avoid resting the elbow on car door/table/etc.  Apply intermittent application of ice to the affected area. Initiate naproxen 1 tablet twice daily with food. Avoid other NSAIDs while taking this medication. Okay to alternate with Tylenol/acetaminophen if needed. Initiate Bactrim DS 1 tablet by mouth twice daily. Does not need drained at this time. If worsens, follow-up with Ortho. ED for any onset of ill feeling such as nausea, vomiting, racing heart, fever or chills or worsening of redness, swelling, pain or streaking of the skin. Medications Discontinued During This Encounter   Medication Reason    doxycycline hyclate (VIBRA-TABS) 100 MG tablet LIST CLEANUP        No follow-ups on file. Plan of care reviewed with patient who verbalizes understanding and wishes to continue. Please note that this chart was generated using dragon dictation software. Although every effort was made to ensure the accuracy of this automated transcription, some errors in transcription may have occurred.     Electronically signed by Geetha Cintron PA-C on 1/24/2023

## 2023-06-20 ENCOUNTER — OFFICE VISIT (OUTPATIENT)
Dept: FAMILY MEDICINE CLINIC | Age: 68
End: 2023-06-20
Payer: COMMERCIAL

## 2023-06-20 VITALS
BODY MASS INDEX: 27.76 KG/M2 | HEART RATE: 60 BPM | WEIGHT: 187.4 LBS | SYSTOLIC BLOOD PRESSURE: 142 MMHG | HEIGHT: 69 IN | DIASTOLIC BLOOD PRESSURE: 70 MMHG | OXYGEN SATURATION: 97 %

## 2023-06-20 DIAGNOSIS — I73.9 PAD (PERIPHERAL ARTERY DISEASE) (HCC): Primary | ICD-10-CM

## 2023-06-20 DIAGNOSIS — E78.2 MIXED HYPERLIPIDEMIA: ICD-10-CM

## 2023-06-20 DIAGNOSIS — I10 PRIMARY HYPERTENSION: ICD-10-CM

## 2023-06-20 DIAGNOSIS — M10.9 GOUT INVOLVING TOE OF RIGHT FOOT, UNSPECIFIED CAUSE, UNSPECIFIED CHRONICITY: ICD-10-CM

## 2023-06-20 PROBLEM — E03.9 HYPOTHYROIDISM: Status: ACTIVE | Noted: 2023-06-20

## 2023-06-20 PROCEDURE — 3077F SYST BP >= 140 MM HG: CPT | Performed by: PHYSICIAN ASSISTANT

## 2023-06-20 PROCEDURE — 99214 OFFICE O/P EST MOD 30 MIN: CPT | Performed by: PHYSICIAN ASSISTANT

## 2023-06-20 PROCEDURE — 1123F ACP DISCUSS/DSCN MKR DOCD: CPT | Performed by: PHYSICIAN ASSISTANT

## 2023-06-20 PROCEDURE — 3078F DIAST BP <80 MM HG: CPT | Performed by: PHYSICIAN ASSISTANT

## 2023-06-20 RX ORDER — LISINOPRIL 40 MG/1
TABLET ORAL
Qty: 30 TABLET | Refills: 5 | Status: SHIPPED | OUTPATIENT
Start: 2023-06-20

## 2023-06-20 RX ORDER — CILOSTAZOL 100 MG/1
TABLET ORAL
Qty: 60 TABLET | Refills: 5 | Status: SHIPPED | OUTPATIENT
Start: 2023-06-20

## 2023-06-20 RX ORDER — ALLOPURINOL 100 MG/1
100 TABLET ORAL DAILY
Qty: 30 TABLET | Refills: 5 | Status: SHIPPED | OUTPATIENT
Start: 2023-06-20

## 2023-06-20 RX ORDER — HYDROCHLOROTHIAZIDE 12.5 MG/1
12.5 CAPSULE, GELATIN COATED ORAL EVERY MORNING
Qty: 90 CAPSULE | Refills: 1 | Status: SHIPPED | OUTPATIENT
Start: 2023-06-20

## 2023-06-20 RX ORDER — LOVASTATIN 40 MG/1
40 TABLET ORAL DAILY
Qty: 30 TABLET | Refills: 5 | Status: SHIPPED | OUTPATIENT
Start: 2023-06-20

## 2023-06-23 ENCOUNTER — TELEPHONE (OUTPATIENT)
Dept: FAMILY MEDICINE CLINIC | Age: 68
End: 2023-06-23

## 2023-06-23 NOTE — TELEPHONE ENCOUNTER
Patient called in wanting to know why Hydrochlorothiazide was called in? He used to be on Lisinopril-Hydrochlorothiazide but he was taken off the water pill about 1-2 years ago. He said he used to be on 20 mg of Lisinopril and Darrell increased it to 40 mg but he's not sure why Hydrochlorothiazide was call in or if he should even take it?

## 2023-09-20 ENCOUNTER — OFFICE VISIT (OUTPATIENT)
Dept: FAMILY MEDICINE CLINIC | Age: 68
End: 2023-09-20
Payer: COMMERCIAL

## 2023-09-20 VITALS
RESPIRATION RATE: 16 BRPM | BODY MASS INDEX: 27.55 KG/M2 | SYSTOLIC BLOOD PRESSURE: 130 MMHG | HEIGHT: 69 IN | WEIGHT: 186 LBS | OXYGEN SATURATION: 94 % | HEART RATE: 58 BPM | DIASTOLIC BLOOD PRESSURE: 80 MMHG

## 2023-09-20 DIAGNOSIS — M10.9 GOUT INVOLVING TOE OF RIGHT FOOT, UNSPECIFIED CAUSE, UNSPECIFIED CHRONICITY: ICD-10-CM

## 2023-09-20 DIAGNOSIS — E03.9 HYPOTHYROIDISM, UNSPECIFIED TYPE: ICD-10-CM

## 2023-09-20 DIAGNOSIS — I73.9 PAD (PERIPHERAL ARTERY DISEASE) (HCC): Primary | ICD-10-CM

## 2023-09-20 DIAGNOSIS — I10 PRIMARY HYPERTENSION: ICD-10-CM

## 2023-09-20 PROCEDURE — 3075F SYST BP GE 130 - 139MM HG: CPT | Performed by: PHYSICIAN ASSISTANT

## 2023-09-20 PROCEDURE — 3079F DIAST BP 80-89 MM HG: CPT | Performed by: PHYSICIAN ASSISTANT

## 2023-09-20 PROCEDURE — 1123F ACP DISCUSS/DSCN MKR DOCD: CPT | Performed by: PHYSICIAN ASSISTANT

## 2023-09-20 PROCEDURE — 99213 OFFICE O/P EST LOW 20 MIN: CPT | Performed by: PHYSICIAN ASSISTANT

## 2023-09-20 ASSESSMENT — PATIENT HEALTH QUESTIONNAIRE - PHQ9
SUM OF ALL RESPONSES TO PHQ QUESTIONS 1-9: 1
SUM OF ALL RESPONSES TO PHQ9 QUESTIONS 1 & 2: 1
2. FEELING DOWN, DEPRESSED OR HOPELESS: 1
1. LITTLE INTEREST OR PLEASURE IN DOING THINGS: 0

## 2023-09-20 NOTE — PROGRESS NOTES
2023    Rylie Orellana    Chief Complaint   Patient presents with    3 Month Follow-Up     Presenting for 3 month follow up visit. HPI  History was obtained from pt. Michelle Robert is a 79 y.o. male with a PMHx as listed below who presents today for 3 month follow up. Needs to get his labs done. 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 Is resistant to many tests or referrals due to costs    1. PAD (peripheral artery disease) ([address])    2. Primary hypertension    3. Hypothyroidism, unspecified type    4. Gout involving toe of right foot, unspecified cause, unspecified chronicity             REVIEW OF SYMPTOMS    Review of Systems    PAST MEDICAL HISTORY  Past Medical History:   Diagnosis Date    Hyperlipidemia     Hypertension        FAMILY HISTORY  No family history on file. SOCIAL HISTORY  Social History     Socioeconomic History    Marital status:      Spouse name: None    Number of children: None    Years of education: None    Highest education level: None   Tobacco Use    Smoking status: Former     Packs/day: 3.00     Years: 10.00     Additional pack years: 0.00     Total pack years: 30.00     Types: Cigarettes     Quit date: 3/13/1984     Years since quittin.5    Smokeless tobacco: Never        SURGICAL HISTORY  No past surgical history on file.               CURRENT MEDICATIONS  Current Outpatient Medications   Medication Sig Dispense Refill    allopurinol (ZYLOPRIM) 100 MG tablet Take 1 tablet by mouth daily 30 tablet 5    cilostazol (PLETAL) 100 MG tablet TAKE 1 TABLET BY MOUTH TWICE DAILY 60 tablet 5    lisinopril (PRINIVIL;ZESTRIL) 40 MG tablet TAKE 1 TABLET BY MOUTH ONCE DAILY 30 tablet 5    lovastatin (MEVACOR) 40 MG tablet Take 1 tablet by mouth daily 30 tablet 5    hydroCHLOROthiazide (MICROZIDE) 12.5 MG capsule Take 1 capsule by mouth every morning (Patient not taking: Reported on 2023) 90 capsule 1    naproxen

## 2023-09-21 ENCOUNTER — CARE COORDINATION (OUTPATIENT)
Dept: CARE COORDINATION | Age: 68
End: 2023-09-21

## 2023-09-27 ENCOUNTER — CARE COORDINATION (OUTPATIENT)
Dept: CARE COORDINATION | Age: 68
End: 2023-09-27

## 2023-12-05 ENCOUNTER — TELEPHONE (OUTPATIENT)
Dept: FAMILY MEDICINE CLINIC | Age: 68
End: 2023-12-05

## 2023-12-05 NOTE — TELEPHONE ENCOUNTER
----- Message from Teri Pereyra sent at 12/5/2023 10:07 AM EST -----  Subject: Message to Provider    QUESTIONS  Information for Provider? Patient's wife called and had gotten the letter   about [place] leaving and she says patient is fine seeing another   provider in office at 3/20 appointment they would like just like to know   who it will be when that is decided. ---------------------------------------------------------------------------  --------------  Denise RUDD  [phone]; OK to leave message on voicemail  ---------------------------------------------------------------------------  --------------  SCRIPT ANSWERS  Relationship to Patient? Spouse/Partner  Representative Name? Lesley Cherry  Is the representative on the Communication Release of Information (DAMARIS)   form in Epic?  Yes

## 2024-03-13 DIAGNOSIS — I10 PRIMARY HYPERTENSION: ICD-10-CM

## 2024-03-13 NOTE — TELEPHONE ENCOUNTER
This nurse sent Rx refill request to pt PCP  Appt coming up.   LV 9-20-23  NA 3-20-24  [facility]

## 2024-03-15 DIAGNOSIS — I10 PRIMARY HYPERTENSION: ICD-10-CM

## 2024-03-15 RX ORDER — LISINOPRIL 40 MG/1
TABLET ORAL
Qty: 30 TABLET | Refills: 0 | Status: SHIPPED | OUTPATIENT
Start: 2024-03-15

## 2024-03-15 RX ORDER — LISINOPRIL 40 MG/1
TABLET ORAL
Qty: 30 TABLET | Refills: 0 | OUTPATIENT
Start: 2024-03-15

## 2024-03-20 ENCOUNTER — OFFICE VISIT (OUTPATIENT)
Dept: FAMILY MEDICINE CLINIC | Age: 69
End: 2024-03-20
Payer: COMMERCIAL

## 2024-03-20 VITALS
OXYGEN SATURATION: 96 % | WEIGHT: 197.2 LBS | RESPIRATION RATE: 16 BRPM | HEART RATE: 65 BPM | BODY MASS INDEX: 29.21 KG/M2 | SYSTOLIC BLOOD PRESSURE: 156 MMHG | DIASTOLIC BLOOD PRESSURE: 72 MMHG | HEIGHT: 69 IN

## 2024-03-20 DIAGNOSIS — Z76.89 ENCOUNTER TO ESTABLISH CARE WITH NEW DOCTOR: Primary | ICD-10-CM

## 2024-03-20 DIAGNOSIS — M1A.0710 CHRONIC IDIOPATHIC GOUT INVOLVING TOE OF RIGHT FOOT WITHOUT TOPHUS: ICD-10-CM

## 2024-03-20 DIAGNOSIS — E78.2 MIXED HYPERLIPIDEMIA: ICD-10-CM

## 2024-03-20 DIAGNOSIS — E03.9 ACQUIRED HYPOTHYROIDISM: ICD-10-CM

## 2024-03-20 DIAGNOSIS — I10 PRIMARY HYPERTENSION: ICD-10-CM

## 2024-03-20 PROCEDURE — 3077F SYST BP >= 140 MM HG: CPT | Performed by: STUDENT IN AN ORGANIZED HEALTH CARE EDUCATION/TRAINING PROGRAM

## 2024-03-20 PROCEDURE — 99214 OFFICE O/P EST MOD 30 MIN: CPT | Performed by: STUDENT IN AN ORGANIZED HEALTH CARE EDUCATION/TRAINING PROGRAM

## 2024-03-20 PROCEDURE — 1123F ACP DISCUSS/DSCN MKR DOCD: CPT | Performed by: STUDENT IN AN ORGANIZED HEALTH CARE EDUCATION/TRAINING PROGRAM

## 2024-03-20 PROCEDURE — 3078F DIAST BP <80 MM HG: CPT | Performed by: STUDENT IN AN ORGANIZED HEALTH CARE EDUCATION/TRAINING PROGRAM

## 2024-03-20 RX ORDER — AMLODIPINE BESYLATE 5 MG/1
5 TABLET ORAL DAILY
Qty: 30 TABLET | Refills: 0 | Status: SHIPPED | OUTPATIENT
Start: 2024-03-20

## 2024-03-20 RX ORDER — LOVASTATIN 40 MG/1
40 TABLET ORAL DAILY
Qty: 30 TABLET | Refills: 5 | Status: SHIPPED | OUTPATIENT
Start: 2024-03-20

## 2024-03-20 RX ORDER — LISINOPRIL 40 MG/1
TABLET ORAL
Qty: 30 TABLET | Refills: 5 | Status: SHIPPED | OUTPATIENT
Start: 2024-03-20

## 2024-03-20 NOTE — PROGRESS NOTES
Subjective     Patient ID: Kalin is a 68 y.o. male who presents for Established New Doctor (NTP- former pt of Darrell Polo) and 6 Month Follow-Up (6 month follow up on chronic conditions. Has been out of allopurinol for a few months and didn't realize it. Does he need to keep taking it?).     Patient presents to establish care (previous Darrell Polo patient).  PMH of HTN, gout, HLD, hypothyroidism. Admits to eating [employer] every day.     HTN --  Currently on lisinopril 40.  Used to be on HCTZ as well, but discontinued.  Does not check BP at home. Denies headache, vision changes, SOB, chest pain, palpitations, or edema.     Hypothyroidism--not currently on anything for this.     Gout -- supposed to be taking allopurinol, but hasn't been since he hasn't had any flair ups.         Review of Systems   Constitutional:  Negative for activity change, appetite change and fever.   HENT:  Negative for congestion, rhinorrhea and sore throat.    Eyes:  Negative for visual disturbance.   Respiratory:  Negative for cough, shortness of breath and wheezing.    Cardiovascular:  Negative for chest pain, palpitations and leg swelling.   Gastrointestinal:  Negative for constipation and diarrhea.   Skin:  Negative for rash.   Neurological:  Negative for headaches.   All other systems reviewed and are negative.       Objective   Vitals:    03/20/24 1305   BP: (!) 156/72   Pulse: 65   Resp: 16   SpO2: 96%       Physical Exam  Vitals and nursing note reviewed.   Constitutional:       General: He is not in acute distress.     Appearance: Normal appearance. He is not ill-appearing, toxic-appearing or diaphoretic.   HENT:      Head: Normocephalic and atraumatic.      Nose: Nose normal.      Mouth/Throat:      Mouth: Mucous membranes are moist.      Pharynx: Oropharynx is clear.   Eyes:      Extraocular Movements: Extraocular movements intact.      Conjunctiva/sclera: Conjunctivae normal.   Cardiovascular:      Rate and Rhythm: Normal

## 2024-03-21 ASSESSMENT — ENCOUNTER SYMPTOMS
COUGH: 0
SHORTNESS OF BREATH: 0
DIARRHEA: 0
SORE THROAT: 0
CONSTIPATION: 0
RHINORRHEA: 0
WHEEZING: 0

## 2024-03-21 NOTE — ASSESSMENT & PLAN NOTE
Changes today = adding amlodipine 5mg  BP is uncontrolled  Meds: ACEi and calcium channel blocker  Labs: last CMP, lipid panel, and urine microalbumin ordered today.   Recommend lifestyle modifications such as weight loss, exercising for at least 120min/wk, and low sodium/DASH diet.

## 2024-03-21 NOTE — ASSESSMENT & PLAN NOTE
-rechecking TSH today  -not currently on levothyroxine.  Will hold off on starting any medications until after TSH results

## 2024-03-21 NOTE — ASSESSMENT & PLAN NOTE
-patient has not been taking allopurinol and declines getting a refill at this time  -discussed lifestyle and dietary changes to help decrease chance of gout flair up

## 2024-03-21 NOTE — ASSESSMENT & PLAN NOTE
Changes today= none  Meds: Statin  Last lipid panel ordered today  Recommend lifestyle modifications such as weight loss, daily exercise for a total of at least 120min/wk, and Mediterranean diet.

## 2024-05-20 ENCOUNTER — COMMUNITY OUTREACH (OUTPATIENT)
Dept: FAMILY MEDICINE CLINIC | Age: 69
End: 2024-05-20

## 2024-06-24 ENCOUNTER — OFFICE VISIT (OUTPATIENT)
Dept: FAMILY MEDICINE CLINIC | Age: 69
End: 2024-06-24
Payer: COMMERCIAL

## 2024-06-24 VITALS
HEART RATE: 55 BPM | WEIGHT: 190 LBS | DIASTOLIC BLOOD PRESSURE: 84 MMHG | HEIGHT: 69 IN | BODY MASS INDEX: 28.14 KG/M2 | SYSTOLIC BLOOD PRESSURE: 142 MMHG | OXYGEN SATURATION: 97 %

## 2024-06-24 DIAGNOSIS — E78.2 MIXED HYPERLIPIDEMIA: ICD-10-CM

## 2024-06-24 DIAGNOSIS — I10 PRIMARY HYPERTENSION: Primary | ICD-10-CM

## 2024-06-24 DIAGNOSIS — I10 PRIMARY HYPERTENSION: ICD-10-CM

## 2024-06-24 DIAGNOSIS — E03.9 ACQUIRED HYPOTHYROIDISM: ICD-10-CM

## 2024-06-24 PROCEDURE — 99214 OFFICE O/P EST MOD 30 MIN: CPT | Performed by: STUDENT IN AN ORGANIZED HEALTH CARE EDUCATION/TRAINING PROGRAM

## 2024-06-24 PROCEDURE — 3077F SYST BP >= 140 MM HG: CPT | Performed by: STUDENT IN AN ORGANIZED HEALTH CARE EDUCATION/TRAINING PROGRAM

## 2024-06-24 PROCEDURE — 1123F ACP DISCUSS/DSCN MKR DOCD: CPT | Performed by: STUDENT IN AN ORGANIZED HEALTH CARE EDUCATION/TRAINING PROGRAM

## 2024-06-24 PROCEDURE — 3079F DIAST BP 80-89 MM HG: CPT | Performed by: STUDENT IN AN ORGANIZED HEALTH CARE EDUCATION/TRAINING PROGRAM

## 2024-06-24 RX ORDER — NIFEDIPINE 30 MG/1
30 TABLET, EXTENDED RELEASE ORAL DAILY
Qty: 90 TABLET | Refills: 1 | Status: SHIPPED | OUTPATIENT
Start: 2024-06-24

## 2024-06-24 SDOH — ECONOMIC STABILITY: FOOD INSECURITY: WITHIN THE PAST 12 MONTHS, YOU WORRIED THAT YOUR FOOD WOULD RUN OUT BEFORE YOU GOT MONEY TO BUY MORE.: NEVER TRUE

## 2024-06-24 SDOH — ECONOMIC STABILITY: HOUSING INSECURITY
IN THE LAST 12 MONTHS, WAS THERE A TIME WHEN YOU DID NOT HAVE A STEADY PLACE TO SLEEP OR SLEPT IN A SHELTER (INCLUDING NOW)?: NO

## 2024-06-24 SDOH — ECONOMIC STABILITY: FOOD INSECURITY: WITHIN THE PAST 12 MONTHS, THE FOOD YOU BOUGHT JUST DIDN'T LAST AND YOU DIDN'T HAVE MONEY TO GET MORE.: NEVER TRUE

## 2024-06-24 SDOH — ECONOMIC STABILITY: INCOME INSECURITY: HOW HARD IS IT FOR YOU TO PAY FOR THE VERY BASICS LIKE FOOD, HOUSING, MEDICAL CARE, AND HEATING?: NOT HARD AT ALL

## 2024-06-24 ASSESSMENT — ENCOUNTER SYMPTOMS
CONSTIPATION: 0
DIARRHEA: 0
RHINORRHEA: 0
WHEEZING: 0
SHORTNESS OF BREATH: 0
SORE THROAT: 0
COUGH: 0

## 2024-06-24 ASSESSMENT — PATIENT HEALTH QUESTIONNAIRE - PHQ9
SUM OF ALL RESPONSES TO PHQ9 QUESTIONS 1 & 2: 0
SUM OF ALL RESPONSES TO PHQ QUESTIONS 1-9: 0
SUM OF ALL RESPONSES TO PHQ QUESTIONS 1-9: 0
2. FEELING DOWN, DEPRESSED OR HOPELESS: NOT AT ALL
SUM OF ALL RESPONSES TO PHQ QUESTIONS 1-9: 0
1. LITTLE INTEREST OR PLEASURE IN DOING THINGS: NOT AT ALL
SUM OF ALL RESPONSES TO PHQ QUESTIONS 1-9: 0

## 2024-06-24 NOTE — ASSESSMENT & PLAN NOTE
Changes today = adding nifedipine 30  BP is uncontrolled  Meds: ACEi  Labs: last CMP, lipid panel, and urine microalbumin drawn today.   Recommend lifestyle modifications such as weight loss, exercising for at least 120min/wk, and low sodium/DASH diet.

## 2024-06-24 NOTE — ASSESSMENT & PLAN NOTE
Changes today= none  Meds: Statin  Last lipid panel = today  Recommend lifestyle modifications such as weight loss, daily exercise for a total of at least 120min/wk, and Mediterranean diet.

## 2024-06-24 NOTE — PROGRESS NOTES
Normal rate and regular rhythm.      Heart sounds: Normal heart sounds.   Pulmonary:      Breath sounds: Normal breath sounds. No wheezing, rhonchi or rales.   Musculoskeletal:         General: Normal range of motion.      Cervical back: No tenderness.      Right lower leg: No edema.      Left lower leg: No edema.   Lymphadenopathy:      Cervical: No cervical adenopathy.   Skin:     General: Skin is warm.      Findings: No lesion or rash.   Neurological:      General: No focal deficit present.      Mental Status: He is alert and oriented to person, place, and time. Mental status is at baseline.   Psychiatric:         Mood and Affect: Mood normal.         Behavior: Behavior normal.         Thought Content: Thought content normal.         Judgment: Judgment normal.         Assessment & Plan     Problem List Items Addressed This Visit       Mixed hyperlipidemia     Changes today= none  Meds: Statin  Last lipid panel = today  Recommend lifestyle modifications such as weight loss, daily exercise for a total of at least 120min/wk, and Mediterranean diet.            Relevant Medications    NIFEdipine (PROCARDIA XL) 30 MG extended release tablet    Primary hypertension - Primary     Changes today = adding nifedipine 30  BP is uncontrolled  Meds: ACEi  Labs: last CMP, lipid panel, and urine microalbumin drawn today.   Recommend lifestyle modifications such as weight loss, exercising for at least 120min/wk, and low sodium/DASH diet.            Relevant Medications    NIFEdipine (PROCARDIA XL) 30 MG extended release tablet    Hypothyroidism      -rechecking TSH since last was 1 year ago  -currently off of medicatoins             CBC, CMP, lipid panel, microalbumin, and TSH ordered at last visit, but never drawn.  Patient had labs done today.

## 2024-06-25 LAB
ALBUMIN SERPL-MCNC: 4.8 G/DL (ref 3.4–5)
ALBUMIN/GLOB SERPL: 2 {RATIO} (ref 1.1–2.2)
ALP SERPL-CCNC: 106 U/L (ref 40–129)
ALT SERPL-CCNC: 28 U/L (ref 10–40)
ANION GAP SERPL CALCULATED.3IONS-SCNC: 11 MMOL/L (ref 3–16)
AST SERPL-CCNC: 23 U/L (ref 15–37)
BASOPHILS # BLD: 0 K/UL (ref 0–0.2)
BASOPHILS NFR BLD: 0.4 %
BILIRUB SERPL-MCNC: 0.4 MG/DL (ref 0–1)
BUN SERPL-MCNC: 13 MG/DL (ref 7–20)
CALCIUM SERPL-MCNC: 10.1 MG/DL (ref 8.3–10.6)
CHLORIDE SERPL-SCNC: 101 MMOL/L (ref 99–110)
CHOLEST SERPL-MCNC: 178 MG/DL (ref 0–199)
CO2 SERPL-SCNC: 27 MMOL/L (ref 21–32)
CREAT SERPL-MCNC: 1.2 MG/DL (ref 0.8–1.3)
CREAT UR-MCNC: <4.2 MG/DL (ref 39–259)
DEPRECATED RDW RBC AUTO: 14.2 % (ref 12.4–15.4)
EOSINOPHIL # BLD: 0.2 K/UL (ref 0–0.6)
EOSINOPHIL NFR BLD: 3.2 %
GFR SERPLBLD CREATININE-BSD FMLA CKD-EPI: 66 ML/MIN/{1.73_M2}
GLUCOSE SERPL-MCNC: 91 MG/DL (ref 70–99)
HCT VFR BLD AUTO: 44.3 % (ref 40.5–52.5)
HDLC SERPL-MCNC: 50 MG/DL (ref 40–60)
HGB BLD-MCNC: 15.1 G/DL (ref 13.5–17.5)
LDLC SERPL CALC-MCNC: 105 MG/DL
LYMPHOCYTES # BLD: 1.9 K/UL (ref 1–5.1)
LYMPHOCYTES NFR BLD: 28.9 %
MCH RBC QN AUTO: 31.3 PG (ref 26–34)
MCHC RBC AUTO-ENTMCNC: 34 G/DL (ref 31–36)
MCV RBC AUTO: 92 FL (ref 80–100)
MICROALBUMIN UR DL<=1MG/L-MCNC: <1.2 MG/DL
MICROALBUMIN/CREAT UR: ABNORMAL MG/G (ref 0–30)
MONOCYTES # BLD: 0.5 K/UL (ref 0–1.3)
MONOCYTES NFR BLD: 7.6 %
NEUTROPHILS # BLD: 3.9 K/UL (ref 1.7–7.7)
NEUTROPHILS NFR BLD: 59.9 %
PLATELET # BLD AUTO: 245 K/UL (ref 135–450)
PMV BLD AUTO: 8.3 FL (ref 5–10.5)
POTASSIUM SERPL-SCNC: 5.3 MMOL/L (ref 3.5–5.1)
PROT SERPL-MCNC: 7.2 G/DL (ref 6.4–8.2)
RBC # BLD AUTO: 4.82 M/UL (ref 4.2–5.9)
SODIUM SERPL-SCNC: 139 MMOL/L (ref 136–145)
TRIGL SERPL-MCNC: 115 MG/DL (ref 0–150)
TSH SERPL DL<=0.005 MIU/L-ACNC: 3.23 UIU/ML (ref 0.27–4.2)
VLDLC SERPL CALC-MCNC: 23 MG/DL
WBC # BLD AUTO: 6.5 K/UL (ref 4–11)

## 2024-06-27 ENCOUNTER — TELEPHONE (OUTPATIENT)
Dept: FAMILY MEDICINE CLINIC | Age: 69
End: 2024-06-27

## 2024-07-02 ENCOUNTER — TELEPHONE (OUTPATIENT)
Dept: FAMILY MEDICINE CLINIC | Age: 69
End: 2024-07-02

## 2024-10-17 DIAGNOSIS — I10 PRIMARY HYPERTENSION: ICD-10-CM

## 2024-10-17 RX ORDER — LISINOPRIL 40 MG/1
TABLET ORAL
Qty: 30 TABLET | Refills: 5 | Status: SHIPPED | OUTPATIENT
Start: 2024-10-17

## 2024-10-22 ENCOUNTER — OFFICE VISIT (OUTPATIENT)
Dept: FAMILY MEDICINE CLINIC | Age: 69
End: 2024-10-22
Payer: COMMERCIAL

## 2024-10-22 VITALS
WEIGHT: 190 LBS | SYSTOLIC BLOOD PRESSURE: 158 MMHG | DIASTOLIC BLOOD PRESSURE: 84 MMHG | OXYGEN SATURATION: 97 % | RESPIRATION RATE: 16 BRPM | HEIGHT: 69 IN | HEART RATE: 63 BPM | BODY MASS INDEX: 28.14 KG/M2

## 2024-10-22 DIAGNOSIS — E78.2 MIXED HYPERLIPIDEMIA: ICD-10-CM

## 2024-10-22 DIAGNOSIS — I10 PRIMARY HYPERTENSION: Primary | ICD-10-CM

## 2024-10-22 PROCEDURE — 99214 OFFICE O/P EST MOD 30 MIN: CPT | Performed by: STUDENT IN AN ORGANIZED HEALTH CARE EDUCATION/TRAINING PROGRAM

## 2024-10-22 PROCEDURE — 1123F ACP DISCUSS/DSCN MKR DOCD: CPT | Performed by: STUDENT IN AN ORGANIZED HEALTH CARE EDUCATION/TRAINING PROGRAM

## 2024-10-22 PROCEDURE — 3079F DIAST BP 80-89 MM HG: CPT | Performed by: STUDENT IN AN ORGANIZED HEALTH CARE EDUCATION/TRAINING PROGRAM

## 2024-10-22 PROCEDURE — 3077F SYST BP >= 140 MM HG: CPT | Performed by: STUDENT IN AN ORGANIZED HEALTH CARE EDUCATION/TRAINING PROGRAM

## 2024-10-22 RX ORDER — NIFEDIPINE 60 MG/1
60 TABLET, EXTENDED RELEASE ORAL DAILY
Qty: 90 TABLET | Refills: 1 | Status: SHIPPED | OUTPATIENT
Start: 2024-10-22

## 2024-10-22 RX ORDER — LOVASTATIN 40 MG/1
40 TABLET ORAL DAILY
Qty: 30 TABLET | Refills: 5 | Status: SHIPPED | OUTPATIENT
Start: 2024-10-22

## 2024-10-22 ASSESSMENT — ENCOUNTER SYMPTOMS
COUGH: 0
SORE THROAT: 0
DIARRHEA: 0
RHINORRHEA: 0
WHEEZING: 0
SHORTNESS OF BREATH: 0
CONSTIPATION: 0

## 2024-10-22 NOTE — ASSESSMENT & PLAN NOTE
Changes today = increasing nifedipine to 60mg  BP is uncontrolled  Meds: ACEi and calcium channel blocker  Labs: last CMP, lipid panel, and urine microalbumin on June 2024.   Recommend lifestyle modifications such as weight loss, exercising for at least 120min/wk, and low sodium/DASH diet.

## 2024-10-22 NOTE — PROGRESS NOTES
Subjective     Patient ID: Kalin is a 69 y.o. male who presents for 3 Month Follow-Up (3 month follow up for hypertension. ).     HTN -- Currently on nifedipine 30, lisinopril 40. He was switched from amlodipine to nifedipine at last visit due to headaches s/e. Headaches have stopped since switching medication. Patient does not check BP at home. Denies headache, vision changes, SOB, chest pain, palpitations, or edema.     HLD -- currently on lovastatin 40mg    Gout -- stable off of medications. Previously on allopurinol.         Review of Systems   Constitutional:  Negative for activity change, appetite change and fever.   HENT:  Negative for congestion, rhinorrhea and sore throat.    Eyes:  Negative for visual disturbance.   Respiratory:  Negative for cough, shortness of breath and wheezing.    Cardiovascular:  Negative for chest pain, palpitations and leg swelling.   Gastrointestinal:  Negative for constipation and diarrhea.   Skin:  Negative for rash.   Neurological:  Negative for headaches.   All other systems reviewed and are negative.       Objective   Vitals:    10/22/24 1329   BP: (!) 158/84   Pulse: 63   Resp: 16   SpO2: 97%       Physical Exam  Vitals and nursing note reviewed.   Constitutional:       General: He is not in acute distress.     Appearance: Normal appearance. He is not ill-appearing, toxic-appearing or diaphoretic.   HENT:      Head: Normocephalic and atraumatic.      Nose: Nose normal.      Mouth/Throat:      Mouth: Mucous membranes are moist.      Pharynx: Oropharynx is clear.   Eyes:      Extraocular Movements: Extraocular movements intact.      Conjunctiva/sclera: Conjunctivae normal.   Cardiovascular:      Rate and Rhythm: Normal rate and regular rhythm.      Heart sounds: Normal heart sounds.   Pulmonary:      Breath sounds: Normal breath sounds. No wheezing, rhonchi or rales.   Musculoskeletal:         General: Normal range of motion.      Cervical back: No tenderness.      Right

## 2024-10-22 NOTE — ASSESSMENT & PLAN NOTE
Changes today= none  Meds: Statin  Last lipid panel = June 2024  Recommend lifestyle modifications such as weight loss, daily exercise for a total of at least 120min/wk, and Mediterranean diet.

## 2025-04-24 ENCOUNTER — OFFICE VISIT (OUTPATIENT)
Dept: FAMILY MEDICINE CLINIC | Age: 70
End: 2025-04-24

## 2025-04-24 VITALS
RESPIRATION RATE: 16 BRPM | OXYGEN SATURATION: 94 % | BODY MASS INDEX: 28.17 KG/M2 | SYSTOLIC BLOOD PRESSURE: 150 MMHG | DIASTOLIC BLOOD PRESSURE: 78 MMHG | HEIGHT: 69 IN | HEART RATE: 59 BPM | WEIGHT: 190.2 LBS

## 2025-04-24 DIAGNOSIS — E78.2 MIXED HYPERLIPIDEMIA: ICD-10-CM

## 2025-04-24 DIAGNOSIS — I10 PRIMARY HYPERTENSION: Primary | ICD-10-CM

## 2025-04-24 DIAGNOSIS — M10.9 GOUT INVOLVING TOE OF RIGHT FOOT, UNSPECIFIED CAUSE, UNSPECIFIED CHRONICITY: ICD-10-CM

## 2025-04-24 DIAGNOSIS — Z13.31 NEGATIVE DEPRESSION SCREENING: ICD-10-CM

## 2025-04-24 RX ORDER — LOVASTATIN 40 MG/1
40 TABLET ORAL DAILY
Qty: 30 TABLET | Refills: 5 | Status: SHIPPED | OUTPATIENT
Start: 2025-04-24

## 2025-04-24 RX ORDER — HYDROCHLOROTHIAZIDE 12.5 MG/1
12.5 CAPSULE ORAL EVERY MORNING
Qty: 90 CAPSULE | Refills: 1 | Status: SHIPPED | OUTPATIENT
Start: 2025-04-24

## 2025-04-24 RX ORDER — ALLOPURINOL 100 MG/1
100 TABLET ORAL DAILY
Qty: 30 TABLET | Refills: 5 | Status: SHIPPED | OUTPATIENT
Start: 2025-04-24

## 2025-04-24 RX ORDER — LISINOPRIL 40 MG/1
TABLET ORAL
Qty: 30 TABLET | Refills: 5 | Status: SHIPPED | OUTPATIENT
Start: 2025-04-24

## 2025-04-24 SDOH — ECONOMIC STABILITY: FOOD INSECURITY: WITHIN THE PAST 12 MONTHS, THE FOOD YOU BOUGHT JUST DIDN'T LAST AND YOU DIDN'T HAVE MONEY TO GET MORE.: NEVER TRUE

## 2025-04-24 SDOH — ECONOMIC STABILITY: FOOD INSECURITY: WITHIN THE PAST 12 MONTHS, YOU WORRIED THAT YOUR FOOD WOULD RUN OUT BEFORE YOU GOT MONEY TO BUY MORE.: NEVER TRUE

## 2025-04-24 ASSESSMENT — PATIENT HEALTH QUESTIONNAIRE - PHQ9
SUM OF ALL RESPONSES TO PHQ QUESTIONS 1-9: 0
SUM OF ALL RESPONSES TO PHQ QUESTIONS 1-9: 0
1. LITTLE INTEREST OR PLEASURE IN DOING THINGS: NOT AT ALL
SUM OF ALL RESPONSES TO PHQ QUESTIONS 1-9: 0
SUM OF ALL RESPONSES TO PHQ QUESTIONS 1-9: 0
2. FEELING DOWN, DEPRESSED OR HOPELESS: NOT AT ALL

## 2025-04-24 NOTE — PROGRESS NOTES
Subjective     Patient ID: Kalin is a 69 y.o. male who presents for 3 Month Follow-Up (3 month), hand swelling (Hand pain 4-5 days ago. Not sure if he was stung. Swelling went away and then came back. Hurting today. ), Medication Problem (Nifedipine was giving him a headache. Quit taking. ), Eye Problem (Has never been to an eye doctor. Through out the day vision becomes blurry. ), and Health Maintenance (Declines colonoscopy. ).     Had hand swelling that started about 1 week ago in left pinky finger. Denies any trauma, injury, or bug sting/bite. Of note, has PMH of gout, but has not been taking allopurinol. Has taken ibuprofen for symptom relief, which has been somewhat helpful.    HTN -- Currently on lisinopril 40. He was switched from amlodipine to nifedipine due to headaches s/e, but since has stopped taking nifedipine due to headaches as well. Patient does not check BP at home. Denies headache, vision changes, SOB, chest pain, palpitations, or edema.      HLD -- currently on lovastatin 40mg     Gout -- stable off of medications. Previously on allopurinol.           Review of Systems   Constitutional:  Negative for activity change, appetite change and fever.   HENT:  Negative for congestion, rhinorrhea and sore throat.    Eyes:  Positive for visual disturbance.   Respiratory:  Negative for cough, shortness of breath and wheezing.    Cardiovascular:  Negative for chest pain, palpitations and leg swelling.   Gastrointestinal:  Negative for constipation and diarrhea.   Musculoskeletal:  Positive for arthralgias and joint swelling.   Skin:  Negative for rash.   Neurological:  Negative for headaches.   All other systems reviewed and are negative.       Objective   Vitals:    04/24/25 1037   BP: (!) 150/78   Pulse: 59   Resp: 16   SpO2: 94%       Physical Exam  Vitals and nursing note reviewed.   Constitutional:       General: He is not in acute distress.     Appearance: Normal appearance. He is not ill-appearing,

## 2025-04-25 ASSESSMENT — ENCOUNTER SYMPTOMS
DIARRHEA: 0
RHINORRHEA: 0
WHEEZING: 0
SHORTNESS OF BREATH: 0
SORE THROAT: 0
CONSTIPATION: 0
COUGH: 0

## 2025-04-25 NOTE — ASSESSMENT & PLAN NOTE
Changes today = will add HCTZ 12.5, but will be cautious with gout.  Holding off on metoprolol initiation for now due to low/normal pulse of 59.   BP is uncontrolled  Meds: ACEi  Labs: last CMP, lipid panel, and urine microalbumin on today.   Recommend lifestyle modifications such as weight loss, exercising for at least 120min/wk, and low sodium/DASH diet.

## 2025-05-19 ENCOUNTER — COMMUNITY OUTREACH (OUTPATIENT)
Dept: FAMILY MEDICINE CLINIC | Age: 70
End: 2025-05-19

## 2025-05-19 NOTE — PROGRESS NOTES
Patient's HM shows they are overdue for Colorectal Screening.   Care Everywhere and  files searched.  No results to attach to order nor HM updated.     Patient declines